# Patient Record
Sex: FEMALE | Race: WHITE | NOT HISPANIC OR LATINO | Employment: UNEMPLOYED | URBAN - METROPOLITAN AREA
[De-identification: names, ages, dates, MRNs, and addresses within clinical notes are randomized per-mention and may not be internally consistent; named-entity substitution may affect disease eponyms.]

---

## 2021-05-05 ENCOUNTER — APPOINTMENT (OUTPATIENT)
Dept: RADIOLOGY | Facility: CLINIC | Age: 51
End: 2021-05-05
Payer: COMMERCIAL

## 2021-05-05 ENCOUNTER — OFFICE VISIT (OUTPATIENT)
Dept: OBGYN CLINIC | Facility: CLINIC | Age: 51
End: 2021-05-05
Payer: COMMERCIAL

## 2021-05-05 VITALS
BODY MASS INDEX: 26.07 KG/M2 | SYSTOLIC BLOOD PRESSURE: 147 MMHG | HEART RATE: 87 BPM | DIASTOLIC BLOOD PRESSURE: 85 MMHG | HEIGHT: 68 IN | WEIGHT: 172 LBS

## 2021-05-05 DIAGNOSIS — M53.3 SACROILIAC JOINT PAIN: Primary | ICD-10-CM

## 2021-05-05 DIAGNOSIS — M54.50 LOW BACK PAIN, UNSPECIFIED BACK PAIN LATERALITY, UNSPECIFIED CHRONICITY, UNSPECIFIED WHETHER SCIATICA PRESENT: ICD-10-CM

## 2021-05-05 DIAGNOSIS — M25.551 PAIN IN RIGHT HIP: ICD-10-CM

## 2021-05-05 DIAGNOSIS — M54.6 THORACIC BACK PAIN, UNSPECIFIED BACK PAIN LATERALITY, UNSPECIFIED CHRONICITY: ICD-10-CM

## 2021-05-05 DIAGNOSIS — M41.9 SCOLIOSIS OF THORACOLUMBAR SPINE, UNSPECIFIED SCOLIOSIS TYPE: ICD-10-CM

## 2021-05-05 DIAGNOSIS — M54.16 ACUTE RIGHT LUMBAR RADICULOPATHY: ICD-10-CM

## 2021-05-05 PROCEDURE — 99204 OFFICE O/P NEW MOD 45 MIN: CPT | Performed by: ORTHOPAEDIC SURGERY

## 2021-05-05 PROCEDURE — 72100 X-RAY EXAM L-S SPINE 2/3 VWS: CPT

## 2021-05-05 PROCEDURE — 72070 X-RAY EXAM THORAC SPINE 2VWS: CPT

## 2021-05-05 PROCEDURE — 73502 X-RAY EXAM HIP UNI 2-3 VIEWS: CPT

## 2021-05-05 RX ORDER — LEVOTHYROXINE SODIUM 100 MCG
100 TABLET ORAL DAILY
COMMUNITY
Start: 2021-03-02

## 2021-05-05 RX ORDER — CYCLOBENZAPRINE HCL 10 MG
10 TABLET ORAL 3 TIMES DAILY
COMMUNITY
Start: 2021-03-18 | End: 2021-05-05 | Stop reason: ALTCHOICE

## 2021-05-05 RX ORDER — NAPROXEN 500 MG/1
500 TABLET ORAL 2 TIMES DAILY WITH MEALS
Qty: 60 TABLET | Refills: 0 | Status: SHIPPED | OUTPATIENT
Start: 2021-05-05 | End: 2021-07-09 | Stop reason: SDUPTHER

## 2021-05-05 NOTE — PROGRESS NOTES
Assessment/Plan:  1  Sacroiliac joint pain  XR hip/pelv 2-3 vws right if performed    naproxen (NAPROSYN) 500 mg tablet    Ambulatory referral to Physical Therapy   2  Acute right lumbar radiculopathy  XR spine lumbar 2 or 3 views injury    naproxen (NAPROSYN) 500 mg tablet    Ambulatory referral to Physical Therapy   3  Scoliosis of thoracolumbar spine, unspecified scoliosis type  XR spine thoracic 2 vw    Ambulatory referral to Physical Therapy      Alesha Rapp has low back pain and radiating pain down her right leg  She also has significant discomfort along her SI joint on the right side  I discussed with her multiple treatment options including formal physical therapy, oral anti-inflammatories, activity as  Tolerated and home exercises  She has a spinal asymmetry on x-ray suggesting a thoracolumbar scoliosis  It appears mild on x-ray of her thoracic spine it seems more accentuated in the lumbar spine but that is likely secondary to her pain  She does not have severe weakness or numbness in her lower extremity to suggest a large disc injury at this time  I would like for her to undergo formal physical therapy for her low back and SI joint over the next 6 weeks  She will follow up in 6 weeks for repeat evaluation  We could consider further imaging or guided SI joint injection based on her symptoms at follow-up appointment  Subjective:   Derrick Pedro is a 48 y o  female who presents  To the office for evaluation for right-sided low back pain and sciatica  She states she injured her back and right hip after a fall  In February of this year  She landed on the ice  She went to another orthopedic office and states she had x-rays and was told she had no fracture and was given no plan that time  She states over the past few months she has had significant aching throbbing pain in the low back causing her to walk abnormally and lean to the side    She feels an intermittent symptoms into the right buttock and occasionally down the right leg  She will feel numbness and tingling into her lower extremity as well  She denies any saddle paresthesias or incontinence  She denies any weakness in her lower extremities  She denies any history of back pain or history of scoliosis  She has been taking oral over-the-counter anti-inflammatory medications and muscle relaxer which have not helped  Review of Systems   Constitutional: Negative for chills, fever and unexpected weight change  HENT: Negative for hearing loss, nosebleeds and sore throat  Eyes: Negative for pain, redness and visual disturbance  Respiratory: Negative for cough, shortness of breath and wheezing  Cardiovascular: Negative for chest pain, palpitations and leg swelling  Gastrointestinal: Negative for abdominal pain, nausea and vomiting  Endocrine: Negative for polydipsia and polyuria  Genitourinary: Negative for dysuria and hematuria  Musculoskeletal:        See HPI   Skin: Negative for rash and wound  Neurological: Negative for dizziness, numbness and headaches  Psychiatric/Behavioral: Negative for decreased concentration and suicidal ideas  The patient is not nervous/anxious            Past Medical History:   Diagnosis Date    Asthma     Disease of thyroid gland        Past Surgical History:   Procedure Laterality Date     SECTION      KIDNEY SURGERY      left kidney removed    TONSILLECTOMY         Family History   Problem Relation Age of Onset    Osteoarthritis Mother     Hypertension Maternal Grandmother     Osteoarthritis Maternal Grandmother        Social History     Occupational History    Not on file   Tobacco Use    Smoking status: Never Smoker    Smokeless tobacco: Never Used   Substance and Sexual Activity    Alcohol use: Never     Frequency: Never    Drug use: Never    Sexual activity: Not on file         Current Outpatient Medications:     Acetaminophen (TYLENOL ARTHRITIS PAIN PO), Take by mouth, Disp: , Rfl:     cyclobenzaprine (FLEXERIL) 10 mg tablet, Take 10 mg by mouth 3 (three) times a day, Disp: , Rfl:     Synthroid 100 MCG tablet, Take 100 mcg by mouth daily, Disp: , Rfl:     No Known Allergies    Objective:  Vitals:    05/05/21 1057   BP: 147/85   Pulse: 87       Back Exam     Tenderness   The patient is experiencing tenderness in the lumbar ( significant tenderness to palpation over right SI joint)  Range of Motion   Extension: abnormal   Flexion: normal Back flexion:  right-sided thoracic hump on forward flexion test      Muscle Strength   Right Quadriceps:  5/5   Left Quadriceps:  5/5   Right Hamstrings:  5/5   Left Hamstrings:  5/5     Tests   Straight leg raise right: negative  Straight leg raise left: negative    Reflexes   Patellar: 2/4    Other   Sensation: decreased ( decreased sensation to light touch over lateral right lower extremity below the knee)  Gait: abnormal             Physical Exam  Vitals signs reviewed  Constitutional:       Appearance: She is well-developed  HENT:      Head: Normocephalic and atraumatic  Eyes:      Conjunctiva/sclera: Conjunctivae normal       Pupils: Pupils are equal, round, and reactive to light  Neck:      Musculoskeletal: Normal range of motion and neck supple  Cardiovascular:      Rate and Rhythm: Normal rate  Pulmonary:      Effort: Pulmonary effort is normal  No respiratory distress  Skin:     General: Skin is warm and dry  Neurological:      Mental Status: She is alert and oriented to person, place, and time  Psychiatric:         Behavior: Behavior normal          I have personally reviewed pertinent films in PACS and my interpretation is as follows: Three-view x-rays of the right hip demonstrate no evidence of acute fracture or significant degenerative changes  Three-view x-rays of the lumbar spine demonstrate no evidence of acute fracture  No significant degenerative changes    Spinal curvature with convexity to the left suggesting scoliosis  Three-view x-rays of the thoracic spine demonstrate a slight thoraco lumbar scoliosis with convexity to the left

## 2021-06-16 ENCOUNTER — OFFICE VISIT (OUTPATIENT)
Dept: OBGYN CLINIC | Facility: CLINIC | Age: 51
End: 2021-06-16
Payer: COMMERCIAL

## 2021-06-16 VITALS
SYSTOLIC BLOOD PRESSURE: 132 MMHG | WEIGHT: 172 LBS | BODY MASS INDEX: 26.07 KG/M2 | HEART RATE: 72 BPM | DIASTOLIC BLOOD PRESSURE: 80 MMHG | HEIGHT: 68 IN

## 2021-06-16 DIAGNOSIS — M54.16 ACUTE RIGHT LUMBAR RADICULOPATHY: Primary | ICD-10-CM

## 2021-06-16 DIAGNOSIS — M41.9 SCOLIOSIS OF THORACOLUMBAR SPINE, UNSPECIFIED SCOLIOSIS TYPE: ICD-10-CM

## 2021-06-16 DIAGNOSIS — M53.3 SACROILIAC JOINT PAIN: ICD-10-CM

## 2021-06-16 PROCEDURE — 99213 OFFICE O/P EST LOW 20 MIN: CPT | Performed by: ORTHOPAEDIC SURGERY

## 2021-06-16 NOTE — PROGRESS NOTES
Assessment/Plan:  1  Acute right lumbar radiculopathy  MRI lumbar spine wo contrast   2  Sacroiliac joint pain     3  Scoliosis of thoracolumbar spine, unspecified scoliosis type         Poonam Walsh  Has continued low back pain and right-sided SI joint pain  She seems to be improving slightly with physical therapy but continues to have radicular symptoms down the right leg and numbness and weakness in the right leg as well  She also has pinpoint pain along the right SI joint  I would like to proceed with an MRI of the lumbar spine to rule out disc herniation at this time  She can continue with physical therapy at for now  If we do not find a significant cause of her radiculopathy based on her MRI we could consider a guided SI joint injection if clinically indicated  Follow up in the office after MRI is complete  Subjective:   Ivy Montiel is a 48 y o  female who presents   To the office for follow-up for low back pain and right-sided radiculopathy  She initially suffered this injury after a slip and fall on ice in February of this year  She was last seen in the office 6 weeks ago where we had x-rays of her lumbar spine, thoracic spine and hip demonstrating slight scoliosis  She also had pain consistent with right lumbar radiculopathy and right SI joint pain  We started her in formal physical therapy at that time which she states has helped slightly  She feels some improvement in her back pain but continues to have radiating pain down the right leg and some weakness and numbness into the right leg  She feels a "Charley horse" feeling in her right thigh at times  Review of Systems   Constitutional: Negative for chills, fever and unexpected weight change  HENT: Negative for hearing loss, nosebleeds and sore throat  Eyes: Negative for pain, redness and visual disturbance  Respiratory: Negative for cough, shortness of breath and wheezing      Cardiovascular: Negative for chest pain, palpitations and leg swelling  Gastrointestinal: Negative for abdominal pain, nausea and vomiting  Endocrine: Negative for polydipsia and polyuria  Genitourinary: Negative for dysuria and hematuria  Musculoskeletal:        See HPI   Skin: Negative for rash and wound  Neurological: Negative for dizziness, numbness and headaches  Psychiatric/Behavioral: Negative for decreased concentration and suicidal ideas  The patient is not nervous/anxious  Past Medical History:   Diagnosis Date    Asthma     Disease of thyroid gland        Past Surgical History:   Procedure Laterality Date     SECTION      KIDNEY SURGERY      left kidney removed    TONSILLECTOMY         Family History   Problem Relation Age of Onset    Osteoarthritis Mother     Hypertension Maternal Grandmother     Osteoarthritis Maternal Grandmother        Social History     Occupational History    Not on file   Tobacco Use    Smoking status: Never Smoker    Smokeless tobacco: Never Used   Vaping Use    Vaping Use: Never used   Substance and Sexual Activity    Alcohol use: Never    Drug use: Never    Sexual activity: Not on file         Current Outpatient Medications:     Acetaminophen (TYLENOL ARTHRITIS PAIN PO), Take by mouth, Disp: , Rfl:     naproxen (NAPROSYN) 500 mg tablet, Take 1 tablet (500 mg total) by mouth 2 (two) times a day with meals, Disp: 60 tablet, Rfl: 0    Synthroid 100 MCG tablet, Take 100 mcg by mouth daily, Disp: , Rfl:     No Known Allergies    Objective:  Vitals:    21 1018   BP: 132/80   Pulse: 72       Back Exam     Tenderness   The patient is experiencing tenderness in the lumbar (  Right SI joint tenderness)  Muscle Strength   Right Quadriceps:  4/5   Left Quadriceps:  5/5   Right Hamstrings:  4/5   Left Hamstrings:  5/5     Tests   Straight leg raise left: negative    Other   Sensation: normal  Gait: normal             Physical Exam  Vitals reviewed     Constitutional:       Appearance: She is well-developed  HENT:      Head: Normocephalic and atraumatic  Eyes:      Conjunctiva/sclera: Conjunctivae normal       Pupils: Pupils are equal, round, and reactive to light  Cardiovascular:      Rate and Rhythm: Normal rate  Pulmonary:      Effort: Pulmonary effort is normal  No respiratory distress  Musculoskeletal:      Cervical back: Normal range of motion and neck supple  Lumbar back: Negative left straight leg raise test    Skin:     General: Skin is warm and dry  Neurological:      Mental Status: She is alert and oriented to person, place, and time     Psychiatric:         Behavior: Behavior normal

## 2021-06-17 ENCOUNTER — TELEPHONE (OUTPATIENT)
Dept: OBGYN CLINIC | Facility: HOSPITAL | Age: 51
End: 2021-06-17

## 2021-06-17 NOTE — TELEPHONE ENCOUNTER
Patient sees Dr Pelon Perez Rota is calling in from [facility] wanting to obtain office notes from yesterday to be faxed over to them         Fax# [phone]

## 2021-06-24 ENCOUNTER — TELEPHONE (OUTPATIENT)
Dept: PAIN MEDICINE | Facility: CLINIC | Age: 51
End: 2021-06-24

## 2021-06-24 NOTE — TELEPHONE ENCOUNTER
Called pt nathaly see if she had MRI completed  Lisha Bear upon looking, a denial leter was scanned into the pt's chart today  I let pt know the MRI was denied, she asked for Dr Calero Senters to file an appeal since she did the physical therapy they requested

## 2021-07-08 ENCOUNTER — TELEPHONE (OUTPATIENT)
Dept: OBGYN CLINIC | Facility: HOSPITAL | Age: 51
End: 2021-07-08

## 2021-07-08 NOTE — TELEPHONE ENCOUNTER
Patient is asking if MRI has been approved for rt hip after previous denial      Patient needs refill, 2 left    naproxen (NAPROSYN) 500 mg tablet [[id number]]     Order Details  Dose: 500 mg Route: Oral Frequency: 2 times daily with meals   Dispense Quantity: 60 tablet Refills: 0          Sig: Take 1 tablet (500 mg total) by mouth 2 (two) times a day with meals         Start Date: 05/05/21 End Date: --   Written Date: 05/05/21 Expiration Date: 05/05/22       Diagnosis Association: Sacroiliac joint pain (M53 3);  Acute right lumbar radiculopathy (M54 16)   Providers    Authorizing Provider:    Carlos Isabel DO   [address] [place] [address]   Phone:  [phone]   Fax:  [phone]   SKYLAR #:  [id number]   NPI:  [id number]        Ordering User:  Carlos Isabel, [address] [facility] [address] [facility], [address] [place] [address] [address]   [address] [place], [place] [address]Breezya Umesh [facility] [id number]   Phone:  [phone]  Fax:  [phone]   SKYLAR #:  --   Order Reconciliation Actions       Order Reconciliation Actions   Warnings History    Total number of overridden warnings: 1   Full Warnings History   E-Prescribing Status    Outpatient Medication Detail    naproxen (NAPROSYN) 500 mg tablet        Sig: Take 1 tablet (500 mg total) by mouth 2 (two) times a day with meals        Sent to pharmacy as: naproxen (NAPROSYN) 500 mg tablet        Class: Normal        Route: Oral        E-Prescribing Status: Receipt confirmed by pharmacy (5/5/2021 12:02 PM EDT)

## 2021-07-09 NOTE — TELEPHONE ENCOUNTER
MRI approved    I did call Merril Ganser to let her know  She is going to contact facility to schedule and then call us back to make a follow up

## 2021-07-09 NOTE — TELEPHONE ENCOUNTER
Please advise if there has been any update regarding MRI status, last notes state it is still pending

## 2021-07-13 ENCOUNTER — TELEPHONE (OUTPATIENT)
Dept: OBGYN CLINIC | Facility: HOSPITAL | Age: 51
End: 2021-07-13

## 2021-07-13 NOTE — TELEPHONE ENCOUNTER
Patient sees Dr Kong Tom  Patient is requesting MRI script to be faxed            FAX: [phone] - [facility]

## 2021-07-28 ENCOUNTER — OFFICE VISIT (OUTPATIENT)
Dept: OBGYN CLINIC | Facility: CLINIC | Age: 51
End: 2021-07-28
Payer: COMMERCIAL

## 2021-07-28 VITALS — WEIGHT: 172 LBS | HEIGHT: 68 IN | BODY MASS INDEX: 26.07 KG/M2

## 2021-07-28 DIAGNOSIS — M51.16 LUMBAR DISC HERNIATION WITH RADICULOPATHY: Primary | ICD-10-CM

## 2021-07-28 PROCEDURE — 99214 OFFICE O/P EST MOD 30 MIN: CPT | Performed by: ORTHOPAEDIC SURGERY

## 2021-07-28 NOTE — PROGRESS NOTES
Assessment/Plan:  1  Lumbar disc herniation with radiculopathy  Ambulatory referral to Pain Management       Rey Aguirre is doing better regarding her low back pain but her MRI does show a large disc herniation L4-5 larger on the right side impacting the nerve root at that level  She does seem to have improved strength and less severe discomfort down her leg  I recommended following up with pain management to see Dr Benja Quiles to discuss potential epidural injection in this area either at this time or if the pain worsens or becomes more severe  She was recommended to continue with her PT exercises at home  She will follow-up with pain management and discuss treatment options going forward  Subjective:   Ronn Ortiz is a 48 y o  female who presents to the office for follow-up for low back pain and right lumbar radiculopathy following a fall in February earlier this year on a slip and fall on ice  At last office visit she had been improving slightly with physical therapy but continued to have ongoing radicular symptoms  He returns to the office today for review of her MRI  She denies any numbness or tingling on a persistent basis but will occasionally get numbness in both legs  She denies any weakness in her legs  Review of Systems   Constitutional: Negative for chills, fever and unexpected weight change  HENT: Negative for hearing loss, nosebleeds and sore throat  Eyes: Negative for pain, redness and visual disturbance  Respiratory: Negative for cough, shortness of breath and wheezing  Cardiovascular: Negative for chest pain, palpitations and leg swelling  Gastrointestinal: Negative for abdominal pain, nausea and vomiting  Endocrine: Negative for polydipsia and polyuria  Genitourinary: Negative for dysuria and hematuria  Musculoskeletal:        See HPI   Skin: Negative for rash and wound  Neurological: Positive for numbness  Negative for dizziness and headaches  Psychiatric/Behavioral: Negative for decreased concentration and suicidal ideas  The patient is not nervous/anxious  Past Medical History:   Diagnosis Date    Asthma     Disease of thyroid gland        Past Surgical History:   Procedure Laterality Date     SECTION      KIDNEY SURGERY      left kidney removed    TONSILLECTOMY         Family History   Problem Relation Age of Onset    Osteoarthritis Mother     Hypertension Maternal Grandmother     Osteoarthritis Maternal Grandmother        Social History     Occupational History    Not on file   Tobacco Use    Smoking status: Never Smoker    Smokeless tobacco: Never Used   Vaping Use    Vaping Use: Never used   Substance and Sexual Activity    Alcohol use: Never    Drug use: Never    Sexual activity: Not on file         Current Outpatient Medications:     Acetaminophen (TYLENOL ARTHRITIS PAIN PO), Take by mouth, Disp: , Rfl:     naproxen (NAPROSYN) 500 mg tablet, Take 1 tablet (500 mg total) by mouth 2 (two) times a day with meals, Disp: 60 tablet, Rfl: 0    Synthroid 100 MCG tablet, Take 100 mcg by mouth daily, Disp: , Rfl:     No Known Allergies    Objective:  Vitals:       Back Exam     Tenderness   Back tenderness location: Mild lumbar tenderness  Range of Motion   Extension: normal     Muscle Strength   Right Quadriceps:  5/5   Left Quadriceps:  5/5   Right Hamstrings:  5/5   Left Hamstrings:  5/5     Tests   Straight leg raise right: negative  Straight leg raise left: negative    Other   Sensation: normal  Gait: normal   Erythema: no back redness            Physical Exam  Vitals reviewed  Constitutional:       Appearance: She is well-developed  HENT:      Head: Normocephalic and atraumatic  Eyes:      Conjunctiva/sclera: Conjunctivae normal       Pupils: Pupils are equal, round, and reactive to light  Cardiovascular:      Rate and Rhythm: Normal rate     Pulmonary:      Effort: Pulmonary effort is normal  No respiratory distress  Musculoskeletal:      Cervical back: Normal range of motion and neck supple  Lumbar back: Negative right straight leg raise test and negative left straight leg raise test    Skin:     General: Skin is warm and dry  Neurological:      Mental Status: She is alert and oriented to person, place, and time  Psychiatric:         Behavior: Behavior normal          I have personally reviewed pertinent films in PACS and my interpretation is as follows:  MRI of the lumbar spine demonstrates a right paracentral disc herniation at L4-5 with significant mass effect on the right L5 nerve root    There also appears to be impingement of the left L5 and S1 nerve root

## 2021-08-06 DIAGNOSIS — M54.16 ACUTE RIGHT LUMBAR RADICULOPATHY: ICD-10-CM

## 2021-08-06 DIAGNOSIS — M53.3 SACROILIAC JOINT PAIN: ICD-10-CM

## 2021-08-06 RX ORDER — NAPROXEN 500 MG/1
500 TABLET ORAL 2 TIMES DAILY WITH MEALS
Qty: 60 TABLET | Refills: 0 | Status: SHIPPED | OUTPATIENT
Start: 2021-08-06

## 2021-09-09 ENCOUNTER — CONSULT (OUTPATIENT)
Dept: PAIN MEDICINE | Facility: CLINIC | Age: 51
End: 2021-09-09
Payer: COMMERCIAL

## 2021-09-09 ENCOUNTER — TELEPHONE (OUTPATIENT)
Dept: PAIN MEDICINE | Facility: CLINIC | Age: 51
End: 2021-09-09

## 2021-09-09 VITALS
BODY MASS INDEX: 26.1 KG/M2 | WEIGHT: 172.2 LBS | DIASTOLIC BLOOD PRESSURE: 97 MMHG | HEART RATE: 72 BPM | SYSTOLIC BLOOD PRESSURE: 155 MMHG | HEIGHT: 68 IN

## 2021-09-09 DIAGNOSIS — M54.41 CHRONIC RIGHT-SIDED LOW BACK PAIN WITH RIGHT-SIDED SCIATICA: ICD-10-CM

## 2021-09-09 DIAGNOSIS — G89.29 CHRONIC RIGHT-SIDED LOW BACK PAIN WITH RIGHT-SIDED SCIATICA: ICD-10-CM

## 2021-09-09 DIAGNOSIS — M51.16 LUMBAR DISC HERNIATION WITH RADICULOPATHY: ICD-10-CM

## 2021-09-09 DIAGNOSIS — G89.4 CHRONIC PAIN SYNDROME: Primary | ICD-10-CM

## 2021-09-09 PROCEDURE — 99204 OFFICE O/P NEW MOD 45 MIN: CPT | Performed by: ANESTHESIOLOGY

## 2021-09-09 NOTE — H&P (VIEW-ONLY)
Assessment:  1  Chronic pain syndrome    2  Chronic right-sided low back pain with right-sided sciatica    3  Lumbar disc herniation with radiculopathy        Plan:  My impressions and treatment recommendations were discussed in detail with the patient, who verbalized understanding and had no further questions  Given that the patient reports low back pain and right lower extremity radiculopathy what appears to be the right L4 and L5 distribution in the context of lumbar disc herniation, I felt it reasonable to offer the patient a right L4 and L5 transforaminal epidural steroid injection since this could be potentially therapeutic  The procedures, its risks, and benefits were explained in detail to the patient  Risks include but are not limited to bleeding, infection, hematoma formation, abscess formation, weakness, headache, failure the pain to improve, nerve irritation or damage, and potential worsening of the pain  The patient verbalized understanding and wished to proceed with the procedure  Follow-up is planned in 4 weeks time or sooner as warranted  Discharge instructions were provided  I personally saw and examined the patient and I agree with the above discussed plan of care  History of Present Illness:    Trae Ellis is a 46 y o  female who presents to [facility] for initial evaluation of the above stated pain complaints  The patient has a past medical and chronic pain history as outlined in the assessment section  She was referred by Dr Milana Gaitan  The patient reports a 7 month history of low back pain and right lower extremity radicular symptoms in what appears to be the right L4 and L5 distribution  He describes his pain as moderate and 3/10 on the verbal numerical pain rating scale  His pain is intermittent in nature without any typical pattern  He describes his pain as cramping, numbness, and sharp  He does not ambulate with any assistive devices  Lying down, exercise, and relaxation decreases pain  Sitting and walking increases pain  Physical therapy, home exercises, and heat/ice treatment provided moderate pain relief  Acetaminophen, ibuprofen, and naproxen do provide her pain relief  Review of Systems:    Review of Systems   Constitutional: Negative for fever and unexpected weight change  HENT: Negative for trouble swallowing  Eyes: Negative for visual disturbance  Respiratory: Negative for shortness of breath and wheezing  Cardiovascular: Negative for chest pain and palpitations  Gastrointestinal: Negative for constipation, diarrhea, nausea and vomiting  Endocrine: Negative for cold intolerance, heat intolerance and polydipsia  Genitourinary: Negative for difficulty urinating and frequency  Musculoskeletal: Negative for arthralgias, gait problem, joint swelling and myalgias  Skin: Negative for rash  Neurological: Negative for dizziness, seizures, syncope, weakness and headaches  Hematological: Does not bruise/bleed easily  Psychiatric/Behavioral: Negative for dysphoric mood  All other systems reviewed and are negative          Patient Active Problem List   Diagnosis    Chronic pain syndrome    Chronic right-sided low back pain with right-sided sciatica    Lumbar disc herniation with radiculopathy       Past Medical History:   Diagnosis Date    Asthma     Disease of thyroid gland        Past Surgical History:   Procedure Laterality Date     SECTION      KIDNEY SURGERY      left kidney removed    TONSILLECTOMY         Family History   Problem Relation Age of Onset    Osteoarthritis Mother     Hypertension Maternal Grandmother     Osteoarthritis Maternal Grandmother        Social History     Occupational History    Not on file   Tobacco Use    Smoking status: Never Smoker    Smokeless tobacco: Never Used   Vaping Use    Vaping Use: Never used   Substance and Sexual Activity    Alcohol use: Never    Drug use: Never    Sexual activity: Not on file         Current Outpatient Medications:     Acetaminophen (TYLENOL ARTHRITIS PAIN PO), Take by mouth, Disp: , Rfl:     naproxen (NAPROSYN) 500 mg tablet, Take 1 tablet (500 mg total) by mouth 2 (two) times a day with meals, Disp: 60 tablet, Rfl: 0    Synthroid 100 MCG tablet, Take 100 mcg by mouth daily, Disp: , Rfl:     No Known Allergies    Physical Exam:    /97   Pulse 72   Ht 5' 8" (1 727 m)   Wt 78 1 kg (172 lb 3 2 oz)   BMI 26 18 kg/m²     Constitutional: normal, well developed, well nourished, alert, in no distress and non-toxic and no overt pain behavior  Eyes: anicteric  HEENT: grossly intact  Neck: supple, symmetric, trachea midline and no masses   Pulmonary:even and unlabored  Cardiovascular:No edema or pitting edema present  Skin:Normal without rashes or lesions and well hydrated  Psychiatric:Mood and affect appropriate  Neurologic:Cranial Nerves II-XII grossly intact  Musculoskeletal:normal     Lumbar Spine Exam    Appearance:  Normal lordosis  Palpation/Tenderness:  no tenderness or spasm  Sensory:  no sensory deficits noted  Range of Motion:  Flexion:   Moderately limited  with pain  Extension:  Moderately limited  with pain  Lateral Flexion - Left:  Moderately limited  with pain  Lateral Flexion - Right:  Moderately limited  with pain  Rotation - Left:  Moderately limited  with pain  Rotation - Right:  Moderately limited  with pain   Lumbar facet loading is negative bilaterally  Motor Strength:  Left hip flexion:  5/5  Left hip extension:  5/5  Right hip flexion:  5/5  Right hip extension:  5/5  Left knee flexion:  5/5  Left knee extension:  5/5  Right knee flexion:  5/5  Right knee extension:  5/5  Left foot dorsiflexion:  5/5  Left foot plantar flexion:  5/5  Right foot dorsiflexion:  5/5  Right foot plantar flexion:  5/5  Reflexes:  Left Patellar:  2+   Right Patellar:  2+   Left Achilles:  2+   Right Achilles:  2+   Special Tests:  Left Straight Leg Test:  negative  Right Straight Leg Test:  negative  Left Paresh's Maneuver:  negative  Right Paresh's Maneuver:  negative    Imaging  MRI LUMBAR SPINE    Media Information

## 2021-09-09 NOTE — TELEPHONE ENCOUNTER
Scheduled patient for 9/23/21  Patient denies RX blood thinners/ NSAIDS  Nothing to eat or drink 1 hour prior to procedure  Needs to arrange transportation  Proper clothing for procedure  No vaccines 2 weeks prior or after procedure  If ill or place on antibiotics, please call to reschedule

## 2021-09-09 NOTE — PROGRESS NOTES
Assessment:  1  Chronic pain syndrome    2  Chronic right-sided low back pain with right-sided sciatica    3  Lumbar disc herniation with radiculopathy        Plan:  My impressions and treatment recommendations were discussed in detail with the patient, who verbalized understanding and had no further questions  Given that the patient reports low back pain and right lower extremity radiculopathy what appears to be the right L4 and L5 distribution in the context of lumbar disc herniation, I felt it reasonable to offer the patient a right L4 and L5 transforaminal epidural steroid injection since this could be potentially therapeutic  The procedures, its risks, and benefits were explained in detail to the patient  Risks include but are not limited to bleeding, infection, hematoma formation, abscess formation, weakness, headache, failure the pain to improve, nerve irritation or damage, and potential worsening of the pain  The patient verbalized understanding and wished to proceed with the procedure  Follow-up is planned in 4 weeks time or sooner as warranted  Discharge instructions were provided  I personally saw and examined the patient and I agree with the above discussed plan of care  History of Present Illness:    Volodymyr Jacobo is a 46 y o  female who presents to [facility] for initial evaluation of the above stated pain complaints  The patient has a past medical and chronic pain history as outlined in the assessment section  She was referred by Dr Brenna Ansari  The patient reports a 7 month history of low back pain and right lower extremity radicular symptoms in what appears to be the right L4 and L5 distribution  He describes his pain as moderate and 3/10 on the verbal numerical pain rating scale  His pain is intermittent in nature without any typical pattern  He describes his pain as cramping, numbness, and sharp  He does not ambulate with any assistive devices  Lying down, exercise, and relaxation decreases pain  Sitting and walking increases pain  Physical therapy, home exercises, and heat/ice treatment provided moderate pain relief  Acetaminophen, ibuprofen, and naproxen do provide her pain relief  Review of Systems:    Review of Systems   Constitutional: Negative for fever and unexpected weight change  HENT: Negative for trouble swallowing  Eyes: Negative for visual disturbance  Respiratory: Negative for shortness of breath and wheezing  Cardiovascular: Negative for chest pain and palpitations  Gastrointestinal: Negative for constipation, diarrhea, nausea and vomiting  Endocrine: Negative for cold intolerance, heat intolerance and polydipsia  Genitourinary: Negative for difficulty urinating and frequency  Musculoskeletal: Negative for arthralgias, gait problem, joint swelling and myalgias  Skin: Negative for rash  Neurological: Negative for dizziness, seizures, syncope, weakness and headaches  Hematological: Does not bruise/bleed easily  Psychiatric/Behavioral: Negative for dysphoric mood  All other systems reviewed and are negative          Patient Active Problem List   Diagnosis    Chronic pain syndrome    Chronic right-sided low back pain with right-sided sciatica    Lumbar disc herniation with radiculopathy       Past Medical History:   Diagnosis Date    Asthma     Disease of thyroid gland        Past Surgical History:   Procedure Laterality Date     SECTION      KIDNEY SURGERY      left kidney removed    TONSILLECTOMY         Family History   Problem Relation Age of Onset    Osteoarthritis Mother     Hypertension Maternal Grandmother     Osteoarthritis Maternal Grandmother        Social History     Occupational History    Not on file   Tobacco Use    Smoking status: Never Smoker    Smokeless tobacco: Never Used   Vaping Use    Vaping Use: Never used   Substance and Sexual Activity    Alcohol use: Never    Drug use: Never    Sexual activity: Not on file         Current Outpatient Medications:     Acetaminophen (TYLENOL ARTHRITIS PAIN PO), Take by mouth, Disp: , Rfl:     naproxen (NAPROSYN) 500 mg tablet, Take 1 tablet (500 mg total) by mouth 2 (two) times a day with meals, Disp: 60 tablet, Rfl: 0    Synthroid 100 MCG tablet, Take 100 mcg by mouth daily, Disp: , Rfl:     No Known Allergies    Physical Exam:    /97   Pulse 72   Ht 5' 8" (1 727 m)   Wt 78 1 kg (172 lb 3 2 oz)   BMI 26 18 kg/m²     Constitutional: normal, well developed, well nourished, alert, in no distress and non-toxic and no overt pain behavior  Eyes: anicteric  HEENT: grossly intact  Neck: supple, symmetric, trachea midline and no masses   Pulmonary:even and unlabored  Cardiovascular:No edema or pitting edema present  Skin:Normal without rashes or lesions and well hydrated  Psychiatric:Mood and affect appropriate  Neurologic:Cranial Nerves II-XII grossly intact  Musculoskeletal:normal     Lumbar Spine Exam    Appearance:  Normal lordosis  Palpation/Tenderness:  no tenderness or spasm  Sensory:  no sensory deficits noted  Range of Motion:  Flexion:   Moderately limited  with pain  Extension:  Moderately limited  with pain  Lateral Flexion - Left:  Moderately limited  with pain  Lateral Flexion - Right:  Moderately limited  with pain  Rotation - Left:  Moderately limited  with pain  Rotation - Right:  Moderately limited  with pain   Lumbar facet loading is negative bilaterally  Motor Strength:  Left hip flexion:  5/5  Left hip extension:  5/5  Right hip flexion:  5/5  Right hip extension:  5/5  Left knee flexion:  5/5  Left knee extension:  5/5  Right knee flexion:  5/5  Right knee extension:  5/5  Left foot dorsiflexion:  5/5  Left foot plantar flexion:  5/5  Right foot dorsiflexion:  5/5  Right foot plantar flexion:  5/5  Reflexes:  Left Patellar:  2+   Right Patellar:  2+   Left Achilles:  2+   Right Achilles:  2+   Special Tests:  Left Straight Leg Test:  negative  Right Straight Leg Test:  negative  Left Paresh's Maneuver:  negative  Right Paresh's Maneuver:  negative    Imaging  MRI LUMBAR SPINE    Media Information

## 2021-09-23 ENCOUNTER — HOSPITAL ENCOUNTER (OUTPATIENT)
Facility: AMBULARY SURGERY CENTER | Age: 51
Setting detail: OUTPATIENT SURGERY
Discharge: HOME | End: 2021-09-23
Attending: ANESTHESIOLOGY | Admitting: ANESTHESIOLOGY
Payer: COMMERCIAL

## 2021-09-23 ENCOUNTER — APPOINTMENT (OUTPATIENT)
Dept: RADIOLOGY | Facility: HOSPITAL | Age: 51
End: 2021-09-23
Payer: COMMERCIAL

## 2021-09-23 VITALS
RESPIRATION RATE: 18 BRPM | DIASTOLIC BLOOD PRESSURE: 99 MMHG | TEMPERATURE: 97.4 F | SYSTOLIC BLOOD PRESSURE: 146 MMHG | HEART RATE: 76 BPM | OXYGEN SATURATION: 98 %

## 2021-09-23 PROCEDURE — 72020 X-RAY EXAM OF SPINE 1 VIEW: CPT

## 2021-09-23 PROCEDURE — 64484 NJX AA&/STRD TFRM EPI L/S EA: CPT | Performed by: ANESTHESIOLOGY

## 2021-09-23 PROCEDURE — 64483 NJX AA&/STRD TFRM EPI L/S 1: CPT | Performed by: ANESTHESIOLOGY

## 2021-09-23 RX ORDER — LIDOCAINE WITH 8.4% SOD BICARB 0.9%(10ML)
SYRINGE (ML) INJECTION AS NEEDED
Status: DISCONTINUED | OUTPATIENT
Start: 2021-09-23 | End: 2021-09-23 | Stop reason: HOSPADM

## 2021-09-23 RX ORDER — BUPIVACAINE HYDROCHLORIDE 2.5 MG/ML
INJECTION, SOLUTION EPIDURAL; INFILTRATION; INTRACAUDAL AS NEEDED
Status: DISCONTINUED | OUTPATIENT
Start: 2021-09-23 | End: 2021-09-23 | Stop reason: HOSPADM

## 2021-09-23 RX ORDER — METHYLPREDNISOLONE ACETATE 80 MG/ML
INJECTION, SUSPENSION INTRA-ARTICULAR; INTRALESIONAL; INTRAMUSCULAR; SOFT TISSUE AS NEEDED
Status: DISCONTINUED | OUTPATIENT
Start: 2021-09-23 | End: 2021-09-23 | Stop reason: HOSPADM

## 2021-09-23 NOTE — INTERVAL H&P NOTE
H&P reviewed  After examining the patient I find no changes in the patients condition since the H&P had been written      Vitals:    09/23/21 1251   BP: 166/96   Pulse: 83   Resp: 16   Temp: (!) 97 4 °F (36 3 °C)   SpO2: 100%

## 2021-09-23 NOTE — OP NOTE
ATTENDING PHYSICIAN:  Mandi Martinez MD     PROCEDURE:  1  Right L4 transforaminal epidural steroid injection under fluoroscopic guidance  2  Right L5 transforaminal epidural steroid injection under fluoroscopic guidance  PRE-PROCEDURE DIAGNOSIS:  Low back pain and right lower extremity radiculopathy  POST-PROCEDURE DIAGNOSIS:  Low back pain and right lower extremity radiculopathy  ANESTHESIA:  Local     ESTIMATED BLOOD LOSS:  Minimal     COMPLICATIONS:  None  LOCATION:  [facility]  CONSENT:  Today's procedure, its potential benefits as well as its risks and potential side effects were reviewed  Discussed risks of the procedure including bleeding, infection, nerve irritation or damage, reactions to the medications, weakness, headache, failure of the pain to improve, and potential worsening of the pain were explained to the patient who verbalized understanding and who wished to proceed  Written informed consent was thereby obtained  DESCRIPTION OF THE PROCEDURE:  After written informed consent was obtained, the patient was taken to the fluoroscopy suite and placed in the prone position  Anatomical landmarks were identified by way of fluoroscopy in multiple views  The skin of the lumbar region was prepped using antiseptic and draped in the usual sterile fashion  Strict aseptic technique was utilized  The skin and subcutaneous tissues at the needle entry site were infiltrated with a total of 5 mL of 1% preservative-free lidocaine using a 25-gauge 1-1/2-inch needle  22-gauge needles were then incrementally advanced under fluoroscopic guidance in the oblique view into the neural foramina as mentioned above  Proper placement into each of the neural foramen was confirmed with fluoroscopy in both the lateral and AP views   After negative aspiration for CSF or heme, contrast was injected, which delineated the nerve roots and the epidural space under fluoroscopy in the AP view  There was only a transient pressure paresthesia that resolved immediately upon injection  After negative aspiration, a 2 mL of a 4 mL injectate consisting of 3 mL of preservative-free 0 25% bupivacaine and 1 mL of Depo-Medrol 80 mg/mL was slowly injected into each of the needles as delineated above  The patient tolerated the procedure well and all needles were removed intact  Hemostasis was maintained  There were no apparent paresthesias or complications  The skin was wiped clean and a Band-Aid was placed as appropriate  The patient was monitored for an appropriate period of time and remained hemodynamically stable following the procedure  The patient was ultimately discharged to home with supervision in good condition and instructed to call the office in approximately 7-10 days with an update or sooner as warranted  Discharge instructions were provided  I was present for and participated in all key and critical portions of this procedure      Akira Anderson MD  9/23/2021  1:24 PM

## 2021-09-23 NOTE — DISCHARGE INSTRUCTIONS
Epidural Steroid Injection   WHAT YOU NEED TO KNOW:   An epidural steroid injection (TIMOTHY) is a procedure to inject steroid medicine into the epidural space  The epidural space is between your spinal cord and vertebrae  Steroids reduce inflammation and fluid buildup in your spine that may be causing pain  You may be given pain medicine along with the steroids  ACTIVITY  · Do not drive or operate machinery today  · No strenuous activity today - bending, lifting, etc   · You may resume normal activites starting tomorrow - start slowly and as tolerated  · You may shower today, but no tub baths or hot tubs  · You may have numbness for several hours from the local anesthetic  Please use caution and common sense, especially with weight-bearing activities  CARE OF THE INJECTION SITE  · If you have soreness or pain, apply ice to the area today (20 minutes on/20 minutes off)  · Starting tomorrow, you may use warm, moist heat or ice if needed  · You may have an increase or change in your discomfort for 36-48 hours after your treatment  · Apply ice and continue with any pain medication you have been prescribed  · Notify [facility] if you have any of the following: redness, drainage, swelling, headache, stiff neck or fever above 100°F     SPECIAL INSTRUCTIONS  · Our office will contact you in approximately 7 days for a progress report  MEDICATIONS  · Continue to take all routine medications  · Our office may have instructed you to hold some medications  As no general anesthesia was used in today's procedure, you should not experience any side effects related to anesthesia  If you have a problem specifically related to your procedure, please call our office at [phone]  Problems not related to your procedure should be directed to your primary care physician

## 2021-09-30 ENCOUNTER — TELEPHONE (OUTPATIENT)
Dept: PAIN MEDICINE | Facility: CLINIC | Age: 51
End: 2021-09-30

## 2021-09-30 NOTE — TELEPHONE ENCOUNTER
Pt reports 40% improvement post inj   Pain level 1/10  She said she has karli horses in her toes since the injection  She said she is also getting hot flashes again     Pt aware I will call next week for an update

## (undated) DEVICE — SMALL NEEDLE COUNTER NEST

## (undated) DEVICE — TOWEL SET X-RAY

## (undated) DEVICE — TRAY EPID CONT PERIFIX 18G X 3.5IN 5ML CLSD TIP DRAPE

## (undated) DEVICE — RADIOLOGY STERILE LABELS: Brand: CENTURION

## (undated) DEVICE — CHLORAPREP APPLICATOR TINTED 10.5ML ONE-STEP

## (undated) DEVICE — PLASTIC ADHESIVE BANDAGE: Brand: CURITY

## (undated) DEVICE — WIPES BABY PAMPERS SENSITIVE 36/PK

## (undated) DEVICE — NEEDLE BLUNT 18 G X 1 1/2 W FILTER

## (undated) DEVICE — SKIN MARKER DUAL TIP WITH RULER CAP, FLEXIBLE RULER AND LABELS: Brand: DEVON

## (undated) DEVICE — SYRINGE 5ML LL

## (undated) DEVICE — NEEDLE SPINAL 22G X 5IN QUINCKE

## (undated) DEVICE — GLOVE SRG BIOGEL 7.5